# Patient Record
Sex: MALE | Race: WHITE | Employment: UNEMPLOYED | URBAN - METROPOLITAN AREA
[De-identification: names, ages, dates, MRNs, and addresses within clinical notes are randomized per-mention and may not be internally consistent; named-entity substitution may affect disease eponyms.]

---

## 2021-10-15 ENCOUNTER — APPOINTMENT (OUTPATIENT)
Dept: CT IMAGING | Age: 86
End: 2021-10-15
Payer: MEDICARE

## 2021-10-15 ENCOUNTER — HOSPITAL ENCOUNTER (EMERGENCY)
Age: 86
Discharge: OTHER INSTITUTION | End: 2021-10-15
Attending: EMERGENCY MEDICINE
Payer: MEDICARE

## 2021-10-15 ENCOUNTER — APPOINTMENT (OUTPATIENT)
Dept: GENERAL RADIOLOGY | Age: 86
End: 2021-10-15
Payer: MEDICARE

## 2021-10-15 VITALS
SYSTOLIC BLOOD PRESSURE: 97 MMHG | TEMPERATURE: 98 F | DIASTOLIC BLOOD PRESSURE: 57 MMHG | RESPIRATION RATE: 20 BRPM | HEART RATE: 46 BPM | OXYGEN SATURATION: 100 %

## 2021-10-15 DIAGNOSIS — Z00.8 ENCOUNTER FOR MEDICAL ASSESSMENT: ICD-10-CM

## 2021-10-15 DIAGNOSIS — R00.1 BRADYCARDIA: Primary | ICD-10-CM

## 2021-10-15 LAB
ANION GAP SERPL CALCULATED.3IONS-SCNC: 9 MMOL/L (ref 3–16)
BASOPHILS ABSOLUTE: 0.1 K/UL (ref 0–0.2)
BASOPHILS RELATIVE PERCENT: 0.9 %
BILIRUBIN URINE: NEGATIVE
BLOOD, URINE: NEGATIVE
BUN BLDV-MCNC: 12 MG/DL (ref 7–20)
CALCIUM SERPL-MCNC: 8.7 MG/DL (ref 8.3–10.6)
CHLORIDE BLD-SCNC: 102 MMOL/L (ref 99–110)
CLARITY: CLEAR
CO2: 26 MMOL/L (ref 21–32)
COLOR: YELLOW
CREAT SERPL-MCNC: 0.6 MG/DL (ref 0.8–1.3)
EKG ATRIAL RATE: 48 BPM
EKG DIAGNOSIS: NORMAL
EKG P AXIS: 62 DEGREES
EKG P-R INTERVAL: 192 MS
EKG Q-T INTERVAL: 462 MS
EKG QRS DURATION: 76 MS
EKG QTC CALCULATION (BAZETT): 412 MS
EKG R AXIS: -13 DEGREES
EKG T AXIS: -25 DEGREES
EKG VENTRICULAR RATE: 48 BPM
EOSINOPHILS ABSOLUTE: 0.1 K/UL (ref 0–0.6)
EOSINOPHILS RELATIVE PERCENT: 2.2 %
GFR AFRICAN AMERICAN: >60
GFR NON-AFRICAN AMERICAN: >60
GLUCOSE BLD-MCNC: 100 MG/DL (ref 70–99)
GLUCOSE URINE: NEGATIVE MG/DL
HCT VFR BLD CALC: 36.6 % (ref 40.5–52.5)
HEMOGLOBIN: 12.2 G/DL (ref 13.5–17.5)
KETONES, URINE: NEGATIVE MG/DL
LEUKOCYTE ESTERASE, URINE: NEGATIVE
LYMPHOCYTES ABSOLUTE: 2.1 K/UL (ref 1–5.1)
LYMPHOCYTES RELATIVE PERCENT: 34.2 %
MCH RBC QN AUTO: 32.5 PG (ref 26–34)
MCHC RBC AUTO-ENTMCNC: 33.5 G/DL (ref 31–36)
MCV RBC AUTO: 97.2 FL (ref 80–100)
MICROSCOPIC EXAMINATION: NORMAL
MONOCYTES ABSOLUTE: 0.5 K/UL (ref 0–1.3)
MONOCYTES RELATIVE PERCENT: 8.5 %
NEUTROPHILS ABSOLUTE: 3.3 K/UL (ref 1.7–7.7)
NEUTROPHILS RELATIVE PERCENT: 54.2 %
NITRITE, URINE: NEGATIVE
PDW BLD-RTO: 14 % (ref 12.4–15.4)
PH UA: 7 (ref 5–8)
PLATELET # BLD: 166 K/UL (ref 135–450)
PMV BLD AUTO: 8.8 FL (ref 5–10.5)
POTASSIUM REFLEX MAGNESIUM: 4.3 MMOL/L (ref 3.5–5.1)
PROTEIN UA: NEGATIVE MG/DL
RBC # BLD: 3.76 M/UL (ref 4.2–5.9)
SODIUM BLD-SCNC: 137 MMOL/L (ref 136–145)
SPECIFIC GRAVITY UA: <=1.005 (ref 1–1.03)
URINE REFLEX TO CULTURE: NORMAL
URINE TYPE: NORMAL
UROBILINOGEN, URINE: 0.2 E.U./DL
WBC # BLD: 6.1 K/UL (ref 4–11)

## 2021-10-15 PROCEDURE — 70450 CT HEAD/BRAIN W/O DYE: CPT

## 2021-10-15 PROCEDURE — 71045 X-RAY EXAM CHEST 1 VIEW: CPT

## 2021-10-15 PROCEDURE — 36415 COLL VENOUS BLD VENIPUNCTURE: CPT

## 2021-10-15 PROCEDURE — 81003 URINALYSIS AUTO W/O SCOPE: CPT

## 2021-10-15 PROCEDURE — 85025 COMPLETE CBC W/AUTO DIFF WBC: CPT

## 2021-10-15 PROCEDURE — 93005 ELECTROCARDIOGRAM TRACING: CPT | Performed by: STUDENT IN AN ORGANIZED HEALTH CARE EDUCATION/TRAINING PROGRAM

## 2021-10-15 PROCEDURE — 2580000003 HC RX 258: Performed by: STUDENT IN AN ORGANIZED HEALTH CARE EDUCATION/TRAINING PROGRAM

## 2021-10-15 PROCEDURE — 99284 EMERGENCY DEPT VISIT MOD MDM: CPT

## 2021-10-15 PROCEDURE — 80048 BASIC METABOLIC PNL TOTAL CA: CPT

## 2021-10-15 RX ORDER — SODIUM CHLORIDE, SODIUM LACTATE, POTASSIUM CHLORIDE, AND CALCIUM CHLORIDE .6; .31; .03; .02 G/100ML; G/100ML; G/100ML; G/100ML
1000 INJECTION, SOLUTION INTRAVENOUS ONCE
Status: COMPLETED | OUTPATIENT
Start: 2021-10-15 | End: 2021-10-15

## 2021-10-15 RX ADMIN — SODIUM CHLORIDE, POTASSIUM CHLORIDE, SODIUM LACTATE AND CALCIUM CHLORIDE 1000 ML: 600; 310; 30; 20 INJECTION, SOLUTION INTRAVENOUS at 13:27

## 2021-10-15 NOTE — FLOWSHEET NOTE
10/15/21 1420   Encounter Summary   Services provided to: Patient   Referral/Consult From: Rounding   Continue Visiting   (10/15/21, ZOLTAN. )   Complexity of Encounter Moderate   Length of Encounter 15 minutes   Routine   Type Initial   Assessment Calm; Approachable   Intervention Nurtured hope   Outcome Expressed gratitude

## 2021-10-15 NOTE — ED NOTES
Pt was explained discharge instructions and questions where answered.      Cherri Castillo RN  10/15/21 9257

## 2021-10-15 NOTE — ED PROVIDER NOTES
ED Attending Attestation Note     Date of evaluation: 10/15/2021    This patient was seen by the resident. I have seen and examined the patient, agree with the workup, evaluation, management and diagnosis. The care plan has been discussed. I have reviewed the ECG and concur with the resident's interpretation. My assessment reveals awake alert male who apparently had mental status change at the nursing home but now back to normal he is awake alert no pronator drift.   Was mildly hypotensive will initiate work-up for hypotension and administer fluids     Tia Bustillos MD  10/15/21 5507

## 2021-10-20 NOTE — ED PROVIDER NOTES
[address] [facility] RESIDENT NOTE       Date of evaluation: 10/15/2021    Chief Complaint     Altered Mental Status (pt comes in from a nursing home, staff states left pupil is blown, pt has low blood pressure, bradycardic. staff is unsure if any of this is new. )      History of Present Illness     Valeria Yates is a 80 y.o. male with unknown PMHx who presents for evaluation from his nursing facility due to concerns for altered mental status and abnormal vital signs. Pt denies any complaints and states he is unsure of why he is here. He cannot contribute further to history. Collateral information obtained from nursing facility: nursing staff reports this was her first day with this patient so is unfamiliar with his baseline. On arrival to the unit this AM, pt was awake, alert and oriented x2. She states she passed morning meds without issue. While she was on break or lunch, her relief was with the pt who reported he became altered and less responsive than normal but she did not know and further details. Vitals taken at that time noted bradycardia and mild hypotension with SBP in the 90s. There was concern for pupil asymmetry as well. When the RN I was speaking too came back from break and reassessed the pt she noted he seemed to be at the baseline she had seen him at earlier this morning. She is not able to tell what medical issues this pt has nor what meds he was given this morning. She states she will fax his medical records. 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 Review of Systems     Review of Systems   Unable to perform ROS: Dementia   All other systems reviewed and are negative. Past Medical, Surgical, Family, and Social History     He has a past medical history of Anemia, Dementia ([facility] [address] Depression, and Insomnia.   He has a past surgical history that includes Cataract removal with implant (Left, [id number]) and Cataract removal with implant (Right, [id number]). His family history is not on file. He reports that he has been smoking cigars. He has never used smokeless tobacco. He reports that he does not drink alcohol and does not use drugs. Medications     Discharge Medication List as of 10/15/2021  5:31 PM      CONTINUE these medications which have NOT CHANGED    Details   moxifloxacin (VIGAMOX) 0.5 % ophthalmic solution Place 1 drop into the left eye 3 times daily. prednisoLONE acetate (PRED FORTE) 1 % ophthalmic suspension Place 1 drop into the left eye 4 times daily. diclofenac (VOLTAREN) 0.1 % ophthalmic solution 1 drop 4 times daily. citalopram (CELEXA) 10 MG tablet Take 20 mg by mouth daily. !! divalproex (DEPAKOTE) 125 MG DR tablet Take 125 mg by mouth 3 times daily. !! divalproex (DEPAKOTE) 250 MG DR tablet Take 250 mg by mouth 3 times daily. melatonin 3 MG TABS tablet Take 3 mg by mouth daily. vitamin D (CHOLECALCIFEROL) 400 UNITS TABS tablet Take 400 Units by mouth daily. !! - Potential duplicate medications found. Please discuss with provider. Allergies     He has No Known Allergies. Physical Exam     INITIAL VITALS: BP: (!) 97/57, Temp: 98 °F (36.7 °C), Pulse: (!) 46, Resp: 20, SpO2: 100 %   Physical Exam  Vitals and nursing note reviewed. Constitutional:       General: He is not in acute distress. Appearance: Normal appearance. He is normal weight. He is not diaphoretic. HENT:      Head: Normocephalic and atraumatic. Mouth/Throat:      Mouth: Mucous membranes are moist.      Pharynx: Oropharynx is clear. Eyes:      General: Lids are normal.      Extraocular Movements: Extraocular movements intact. Conjunctiva/sclera: Conjunctivae normal.      Right eye: Right conjunctiva is not injected. No chemosis, exudate or hemorrhage.      Left eye: Left conjunctiva is not injected. No chemosis, exudate or hemorrhage. Pupils:      Right eye: Pupil is round. Left eye: Pupil is not round. Pupil is reactive. Comments: Left pupil is teardrop shaped and not reactive. Cardiovascular:      Rate and Rhythm: Normal rate and regular rhythm. Pulses: Normal pulses. Heart sounds: Normal heart sounds. No murmur heard. Pulmonary:      Effort: Pulmonary effort is normal. No respiratory distress. Breath sounds: Normal breath sounds. No wheezing, rhonchi or rales. Chest:      Chest wall: No tenderness. Abdominal:      General: Abdomen is flat. There is no distension. Palpations: Abdomen is soft. Tenderness: There is no abdominal tenderness. There is no guarding. Musculoskeletal:         General: No swelling, tenderness, deformity or signs of injury. Cervical back: Normal range of motion and neck supple. No rigidity. No muscular tenderness. Right lower leg: No edema. Left lower leg: No edema. Skin:     General: Skin is warm and dry. Capillary Refill: Capillary refill takes less than 2 seconds. Coloration: Skin is not jaundiced. Findings: No erythema or rash. Neurological:      General: No focal deficit present. Mental Status: He is alert. Mental status is at baseline. GCS: GCS eye subscore is 4. GCS verbal subscore is 5. GCS motor subscore is 6. Cranial Nerves: No cranial nerve deficit or facial asymmetry. Sensory: No sensory deficit. Motor: Motor function is intact. No weakness, tremor or abnormal muscle tone. Coordination: Coordination normal.      Gait: Gait is intact. Gait normal.   Psychiatric:         Mood and Affect: Mood normal.         Behavior: Behavior normal.         Thought Content:  Thought content normal.         Judgment: Judgment normal.         DiagnosticResults     EKG   Interpreted in conjunction with emergencydepartment physician No att. providers found  Rhythm: sinus bradycardia  Rate: bradycardia  Axis: normal  Ectopy: none  Conduction: normal  ST Segments: nonspecific changes  T Waves:normal  Q Waves: none  Clinical Impression: sinus bradycardia  Comparison:  None available    RADIOLOGY:  CT Head WO Contrast   Final Result      1. No intracranial hemorrhage, mass effect or large acute territorial infarction   2. Mild atrophy and chronic small vessel ischemia      XR CHEST PORTABLE   Final Result   Impression: Nodular consolidation of the right lung base. Underlying mass lesion is not excluded.           LABS:   Results for orders placed or performed during the hospital encounter of 10/15/21   CBC Auto Differential   Result Value Ref Range    WBC 6.1 4.0 - 11.0 K/uL    RBC 3.76 (L) 4.20 - 5.90 M/uL    Hemoglobin 12.2 (L) 13.5 - 17.5 g/dL    Hematocrit 36.6 (L) 40.5 - 52.5 %    MCV 97.2 80.0 - 100.0 fL    MCH 32.5 26.0 - 34.0 pg    MCHC 33.5 31.0 - 36.0 g/dL    RDW 14.0 12.4 - 15.4 %    Platelets 442 060 - 299 K/uL    MPV 8.8 5.0 - 10.5 fL    Neutrophils % 54.2 %    Lymphocytes % 34.2 %    Monocytes % 8.5 %    Eosinophils % 2.2 %    Basophils % 0.9 %    Neutrophils Absolute 3.3 1.7 - 7.7 K/uL    Lymphocytes Absolute 2.1 1.0 - 5.1 K/uL    Monocytes Absolute 0.5 0.0 - 1.3 K/uL    Eosinophils Absolute 0.1 0.0 - 0.6 K/uL    Basophils Absolute 0.1 0.0 - 0.2 K/uL   Basic Metabolic Panel w/ Reflex to MG   Result Value Ref Range    Sodium 137 136 - 145 mmol/L    Potassium reflex Magnesium 4.3 3.5 - 5.1 mmol/L    Chloride 102 99 - 110 mmol/L    CO2 26 21 - 32 mmol/L    Anion Gap 9 3 - 16    Glucose 100 (H) 70 - 99 mg/dL    BUN 12 7 - 20 mg/dL    CREATININE 0.6 (L) 0.8 - 1.3 mg/dL    GFR Non-African American >60 >60    GFR African American >60 >60    Calcium 8.7 8.3 - 10.6 mg/dL   Urinalysis Reflex to Culture    Specimen: Urine, clean catch   Result Value Ref Range    Color, UA Yellow Straw/Yellow    Clarity, UA Clear Clear    Glucose, Ur Negative Negative mg/dL and unknown baseline HR make me less concerns for acute bradycardia or associated hemodynamic instability. Additional records not received. The pt was observed in the ED for a prolonged period of time during which he had no mental status changes and on reassessment after 1L IVF, had normalized vital signs. At this time, feel the pt is safe to go back to his monitored setting at his care facility. This patient was also evaluated by the attending physician. All care plans werediscussed and agreed upon. Clinical Impression     1. Bradycardia    2.  Encounter for medical assessment        Disposition     PATIENT REFERRED TO:  Hiram Choi MD  [address] [place] [address]  [phone]    Schedule an appointment as soon as possible for a visit         DISCHARGE MEDICATIONS:  Discharge Medication List as of 10/15/2021  5:31 PM          DISPOSITION Decision To Discharge 10/15/2021 05:58:35 PM       Diane Angela MD  Resident  10/20/21 7315

## 2023-02-24 ENCOUNTER — HOSPITAL ENCOUNTER (OUTPATIENT)
Dept: CT IMAGING | Age: 88
Discharge: HOME | End: 2023-02-24
Payer: MEDICARE

## 2023-02-24 DIAGNOSIS — K40.90 INGUINAL HERNIA WITHOUT OBSTRUCTION OR GANGRENE, RECURRENCE NOT SPECIFIED, UNSPECIFIED LATERALITY: ICD-10-CM

## 2023-02-24 PROCEDURE — 74176 CT ABD & PELVIS W/O CONTRAST: CPT

## 2023-03-01 ENCOUNTER — HOSPITAL ENCOUNTER (EMERGENCY)
Age: 88
Discharge: HOME | End: 2023-03-01
Attending: EMERGENCY MEDICINE
Payer: MEDICAID

## 2023-03-01 VITALS
TEMPERATURE: 98.9 F | SYSTOLIC BLOOD PRESSURE: 116 MMHG | RESPIRATION RATE: 18 BRPM | HEART RATE: 83 BPM | DIASTOLIC BLOOD PRESSURE: 66 MMHG | OXYGEN SATURATION: 98 %

## 2023-03-01 DIAGNOSIS — R53.1 GENERAL WEAKNESS: Primary | ICD-10-CM

## 2023-03-01 PROCEDURE — 99283 EMERGENCY DEPT VISIT LOW MDM: CPT

## 2023-03-01 RX ORDER — LACTOSE-REDUCED FOOD
1 LIQUID (ML) ORAL 3 TIMES DAILY
COMMUNITY

## 2023-03-01 RX ORDER — SODIUM PHOSPHATE, DIBASIC AND SODIUM PHOSPHATE, MONOBASIC 7; 19 G/133ML; G/133ML
1 ENEMA RECTAL DAILY PRN
COMMUNITY

## 2023-03-01 RX ORDER — ELECTROLYTES/DEXTROSE
1 SOLUTION, ORAL ORAL DAILY
COMMUNITY

## 2023-03-01 RX ORDER — ACETAMINOPHEN 325 MG/1
650 TABLET ORAL EVERY 6 HOURS PRN
COMMUNITY

## 2023-03-01 ASSESSMENT — ENCOUNTER SYMPTOMS
DIARRHEA: 0
CHEST TIGHTNESS: 0
SHORTNESS OF BREATH: 0
EYE ITCHING: 0
SINUS PRESSURE: 0
RHINORRHEA: 0
CHOKING: 0
COLOR CHANGE: 0
CONSTIPATION: 0
BACK PAIN: 0
SORE THROAT: 0
ABDOMINAL PAIN: 0
EYE DISCHARGE: 0
NAUSEA: 0

## 2023-03-01 ASSESSMENT — PAIN - FUNCTIONAL ASSESSMENT: PAIN_FUNCTIONAL_ASSESSMENT: NONE - DENIES PAIN

## 2023-03-01 NOTE — ED TRIAGE NOTES
Patient sent in the ER from [facility] for pain all over. Patient has Dementia, denies pain when asked. Patient moves arms without issues. Legs are rigid and stiff. Patient has no complaints.  No signs of injury

## 2023-03-01 NOTE — ED NOTES
Transport here to take pt back to SNF. Report given. Pt discharged at this time.      Temo Schafer RN  03/01/23 6213

## 2023-03-01 NOTE — ED NOTES
Clinical Pharmacy Progress Note  Medication History     ED Encounter Date: 2/28/2023     List of of current medications patient is taking is complete. Home Medication list in EPIC updated to reflect changes noted below. Source of information: Transfer documents provided by patient's nursing facility [facility])     [facility]'s home [facility]: [facility]  [address] [place], [place] - Ph: [phone]    Changes made to medication list:   Medications removed: (include reason, ex: therapy completed, patient no longer taking, etc.)  Citalopram - no longer taking per transfer documents  Diclofenac opth solution - no longer taking per transfer documents  Divalproex - no longer taking per transfer documents  Melatonin - no longer taking per transfer documents  Moxifloxacin opth solution - no longer taking per transfer documents  Prednisolone opth suspension - no longer taking per transfer documents  Medications added:   Acetaminophen  Ensure Plus   Multivitamin  Sodium phosphate enema  Medications adjusted:   Cholecalciferol - dose adjusted to 1000 units PO daily  Other notes:   Medication history completed via transfer documents available at patient's bedside provided by patient's nursing facility [facility]). Current Outpatient Medications   Medication Instructions    acetaminophen (TYLENOL) 650 mg, Oral, EVERY 6 HOURS PRN    Multiple Vitamin (MULTIVITAMIN ADULT) TABS 1 tablet, Oral, DAILY    Nutritional Supplements (ENSURE PLUS) LIQD 1 Bottle, Oral, 3 TIMES DAILY    sodium phosphate (FLEET) 7-19 GM/118ML 1 enema, Rectal, DAILY PRN    Vitamin D (CHOLECALCIFEROL) 1,000 Units, Oral, DAILY       Please call with any questions.     Marie Morin, PharmD, [facility]  Clinical Pharmacist - Emergency Dept  Wireless: [phone]  3/1/2023 2:23 PM

## 2023-03-01 NOTE — ED PROVIDER NOTES
ED Attending Attestation Note     Date of evaluation: 3/1/2023    This patient was seen by the advance practice provider. I have seen and examined the patient, agree with the workup, evaluation, management and diagnosis. The care plan has been discussed. My assessment reveals patient sent from nursing home for alleged pain all over. Here patient has no pain and has normal vital signs. He was assessed by both myself and the DANDY and we cannot elicit any pain out of the patient's therefore we could not justify any type of evaluation based on no symptoms. Nursing home information was obtained and they reported that the patient was not wanting to get up and walk today and not wanting to eat however he already had eaten at the nursing home before he came here and ate here and walked here. With no other complaints he was discharged back to the nursing home.      Danis Nathan MD  03/01/23 9367

## 2023-03-01 NOTE — DISCHARGE INSTRUCTIONS
- You were seen in the emergency department due to generalized weakness. When evaluated, patient seems back to baseline without neurodeficit. He denies any pain while in the ED.  -No changes in medication.  -Strict return precautions include chest pain, shortness of breath, nausea, vomiting, altered mental status or other concerning symptoms.

## 2023-03-01 NOTE — ED PROVIDER NOTES
[address] [facility] [address] ENCOUNTER          PHYSICIAN ASSISTANT NOTE       Date of evaluation: 3/1/2023    Chief Complaint     Pain (Patient sent in the ER from [facility] for pain all over. Patient has Dementia, denies pain when asked. Patient moves arms without issues. Legs are rigid and stiff. Patient has no complaints )      History of Present Illness     Andrea Jimenez is a 80 y.o. male who presents due to \"pain all over\". He has dementia and resides at a nursing home. He was complaining of pain all over and therefore they brought him to the ER. On evaluation, patient is A&O x1. He is pleasantly confused. He reports he does not have any pain. No complaints at this time. He is able to move all 4 extremities without discomfort. No abdominal pain. No SOB. Update history from nursing [facility]:  He did not want to get out of bed yesterday or today, he ate about 50% of his meal by himself today. \"  Not himself\"    ASSESSMENT / PLAN  (MEDICAL DECISION MAKING)     INITIAL VITALS: BP: 116/66, Temp: 98.9 °F (37.2 °C), Heart Rate: 83, Resp: 18, SpO2: 98 %    Andrea Jimenez is a 80 y.o. male who presents due to \"pain all over\". He is dementia and resides at a nursing home was complaining of pain and therefore they brought him to the ER. He is A&O x1, pleasantly confused. On evaluation his vital signs are stable with no abnormalities. He is able to move all 4 extremities without discomfort. He does not have any abdominal pain or guarding. No SOB or abnormal lung sounds. After further conversation with patient's nursing home, [place] they were concerned that he did not get out of bed yesterday or today and that he did not have as much of an appetite. Although upon further evaluation patient did eat 50% of his meal by himself today. He did get out of bed during ER visit, he was able to take a couple of steps with assistance.   He is not incontinent, asked for urinal and was able to urinate without difficulty, hematuria, or pain. We then contacted the nursing home and gave them an update of his condition, they states that he is back to his normal baseline he is also joking around with staff. His legal guardian did call this PA and discussed his case. She was concerned about urinary retention however I did discuss with her that he was able to urinate without any difficulty. At this time I feel it is reasonable to discharge the patient as both the attending, nursing staff and midlevel could not elicit any pain or complaint. No justification of any type of evaluation based on symptoms. There are no changes to his medication at discharge. Return precautions include chest pain, shortness of breath, fevers, chills or other concerning symptoms. Medical Decision Making  Problems Addressed:  General weakness: chronic illness or injury        This patient was also evaluated by the attending physician. All care plans were discussed and agreed upon. Clinical Impression     1. General weakness        Disposition     PATIENT REFERRED TO:  Sarah Scott MD  [facility] [place] [address] [address] [place] [address], INC. Emergency Department  [address] [place] [address]  Go to   If symptoms worsen    DISCHARGE MEDICATIONS:  New Prescriptions    No medications on file       DISPOSITION Decision To Discharge 03/01/2023 02:36:35 PM        Diagnostic Results and Other Data     RADIOLOGY:  No orders to display       LABS:   No results found for this visit on 03/01/23. EKG   None    ED BEDSIDE ULTRASOUND:  No results found. RECENT VITALS:  BP: 116/66, Temp: 98.9 °F (37.2 °C), Heart Rate: 83, Resp: 18, SpO2: 98 %     Procedures         ED Course     Nursing Notes, Past Medical Hx,Past Surgical Hx, Social Hx, Allergies, and Family Hx were reviewed.        The patient was given the following medications:  No orders of the defined types were placed in this encounter. CONSULTS:  None    Review of Systems     Review of Systems   Constitutional:  Negative for chills, diaphoresis and fatigue. HENT:  Negative for congestion, postnasal drip, rhinorrhea, sinus pressure and sore throat. Eyes:  Negative for discharge, itching and visual disturbance. Respiratory:  Negative for choking, chest tightness and shortness of breath. Cardiovascular:  Negative for chest pain and palpitations. Gastrointestinal:  Negative for abdominal pain, constipation, diarrhea and nausea. Endocrine: Negative for polyphagia and polyuria. Genitourinary:  Negative for difficulty urinating, dysuria, flank pain and hematuria. Musculoskeletal:  Negative for arthralgias, back pain and gait problem. Skin:  Negative for color change and wound. Allergic/Immunologic: Negative for immunocompromised state. Neurological:  Negative for dizziness, syncope, light-headedness and headaches. Hematological:  Does not bruise/bleed easily. Psychiatric/Behavioral:  Negative for behavioral problems and confusion. All other systems reviewed and are negative. Past Medical, Surgical, Family, and Social History     He has a past medical history of Anemia, Dementia ([address] Depression, and Insomnia. He has a past surgical history that includes Cataract removal with implant (Left, [id number]) and Cataract removal with implant (Right, [id number]). His family history is not on file. He reports that he has quit smoking. His smoking use included cigars. He has never used smokeless tobacco. He reports that he does not drink alcohol and does not use drugs.     Medications     Previous Medications    ACETAMINOPHEN (TYLENOL) 325 MG TABLET    Take 650 mg by mouth every 6 hours as needed for Pain or Fever    MULTIPLE VITAMIN (MULTIVITAMIN ADULT) TABS    Take 1 tablet by mouth daily    NUTRITIONAL SUPPLEMENTS (ENSURE PLUS) LIQD    Take 1 Bottle by mouth 3 times daily    SODIUM PHOSPHATE (FLEET) 7-19 GM/118ML    Place 1 enema rectally daily as needed (constipation)    VITAMIN D (CHOLECALCIFEROL) 25 MCG (1000 UT) TABS TABLET    Take 1,000 Units by mouth daily       Allergies     He has No Known Allergies. Physical Exam     INITIAL VITALS: BP: 116/66, Temp: 98.9 °F (37.2 °C), Heart Rate: 83, Resp: 18, SpO2: 98 %  Physical Exam  Constitutional:       Appearance: Normal appearance. HENT:      Head: Normocephalic and atraumatic. Right Ear: External ear normal.      Left Ear: External ear normal.      Nose: Nose normal.      Mouth/Throat:      Mouth: Mucous membranes are moist.   Eyes:      Extraocular Movements: Extraocular movements intact. Conjunctiva/sclera: Conjunctivae normal.      Pupils: Pupils are equal, round, and reactive to light. Cardiovascular:      Rate and Rhythm: Normal rate and regular rhythm. Pulses: Normal pulses. Heart sounds: Normal heart sounds. Pulmonary:      Effort: Pulmonary effort is normal.      Breath sounds: Normal breath sounds. Abdominal:      General: Abdomen is flat. Palpations: Abdomen is soft. Musculoskeletal:         General: Normal range of motion. Cervical back: Normal range of motion and neck supple. Skin:     General: Skin is warm. Capillary Refill: Capillary refill takes less than 2 seconds. Neurological:      Mental Status: He is alert. Mental status is at baseline.       Comments: A&O x1, @baseline due to dementia   Psychiatric:         Mood and Affect: Mood normal.         Behavior: Behavior normal.          Renan Maurer PA-C  03/01/23 2190

## 2023-03-17 ENCOUNTER — OFFICE VISIT (OUTPATIENT)
Dept: SURGERY | Age: 88
End: 2023-03-17
Payer: MEDICARE

## 2023-03-17 VITALS — HEART RATE: 110 BPM | SYSTOLIC BLOOD PRESSURE: 125 MMHG | DIASTOLIC BLOOD PRESSURE: 69 MMHG

## 2023-03-17 DIAGNOSIS — K40.90 LEFT INGUINAL HERNIA: Primary | ICD-10-CM

## 2023-03-17 PROCEDURE — 1123F ACP DISCUSS/DSCN MKR DOCD: CPT | Performed by: SURGERY

## 2023-03-17 PROCEDURE — 99203 OFFICE O/P NEW LOW 30 MIN: CPT | Performed by: SURGERY

## 2023-03-17 NOTE — PROGRESS NOTES
PATIENT NAME: Radha Redmond     YOB: 1929     TODAY'S DATE: 3/22/2023    Reason for Visit:  Left inguinal hernia    Requesting Physician:  Dr. Yeager Silence:              The patient is a 80 y.o. male with a PMHx as delineated below who presents with a long history of a large left inguinal hernia. This has not been associated with pain or obstructive complaints.      Chief Complaint   Patient presents with    New Patient     Inguinal hernia        REVIEW OF SYSTEMS:  CONSTITUTIONAL:  negative  HEENT:  negative  RESPIRATORY:  negative  CARDIOVASCULAR:  negative  GASTROINTESTINAL:  negative  GENITOURINARY:  negative  HEMATOLOGIC/LYMPHATIC:  negative  MUSCULOSKELETAL: negative  NEUROLOGICAL:  negative    PMH  Past Medical History:   Diagnosis Date    Anemia     Dementia ([address]     Depression     Insomnia        PSH  Past Surgical History:   Procedure Laterality Date    CATARACT REMOVAL WITH IMPLANT Left [id number]    LEFT EYE CATARACT PHACOEMULSIFICATION , VISIBLUE    CATARACT REMOVAL WITH IMPLANT Right [id number]       Social History  Social History     Socioeconomic History    Marital status: Single     Spouse name: Not on file    Number of children: Not on file    Years of education: Not on file    Highest education level: Not on file   Occupational History    Not on file   Tobacco Use    Smoking status: Former     Types: Cigars    Smokeless tobacco: Never   Substance and Sexual Activity    Alcohol use: No    Drug use: Never    Sexual activity: Not Currently     Partners: Female   Other Topics Concern    Not on file   Social History Narrative    Not on file     Social Determinants of Health     Financial Resource Strain: Not on file   Food Insecurity: Not on file   Transportation Needs: Not on file   Physical Activity: Not on file   Stress: Not on file   Social Connections: Not on file   Intimate Partner Violence: Not on file   Housing Stability: Not on file       Family History:   No